# Patient Record
Sex: MALE | Race: WHITE | NOT HISPANIC OR LATINO | Employment: UNEMPLOYED | URBAN - METROPOLITAN AREA
[De-identification: names, ages, dates, MRNs, and addresses within clinical notes are randomized per-mention and may not be internally consistent; named-entity substitution may affect disease eponyms.]

---

## 2023-01-09 ENCOUNTER — TELEPHONE (OUTPATIENT)
Dept: FAMILY MEDICINE | Facility: CLINIC | Age: 23
End: 2023-01-09
Payer: MEDICAID

## 2023-01-09 NOTE — TELEPHONE ENCOUNTER
----- Message from Pablo Evans sent at 1/6/2023  9:31 AM CST -----      Name of Who is Calling:PT          What is the request in detail:PT is requesting a call back to discuss an appointment as soon as possible to est [email or web address]          Can the clinic reply by [facility]:no          What Number to Call Back if not in [phone]